# Patient Record
Sex: MALE | Race: WHITE
[De-identification: names, ages, dates, MRNs, and addresses within clinical notes are randomized per-mention and may not be internally consistent; named-entity substitution may affect disease eponyms.]

---

## 2020-01-14 ENCOUNTER — HOSPITAL ENCOUNTER (OUTPATIENT)
Dept: HOSPITAL 19 - SDCO | Age: 50
Discharge: HOME | End: 2020-01-14
Attending: SURGERY
Payer: COMMERCIAL

## 2020-01-14 VITALS — DIASTOLIC BLOOD PRESSURE: 89 MMHG | SYSTOLIC BLOOD PRESSURE: 136 MMHG | HEART RATE: 70 BPM | TEMPERATURE: 97.9 F

## 2020-01-14 VITALS — SYSTOLIC BLOOD PRESSURE: 126 MMHG | HEART RATE: 70 BPM | DIASTOLIC BLOOD PRESSURE: 74 MMHG

## 2020-01-14 VITALS — SYSTOLIC BLOOD PRESSURE: 128 MMHG | HEART RATE: 65 BPM | DIASTOLIC BLOOD PRESSURE: 81 MMHG

## 2020-01-14 VITALS — HEART RATE: 63 BPM | DIASTOLIC BLOOD PRESSURE: 76 MMHG | SYSTOLIC BLOOD PRESSURE: 130 MMHG

## 2020-01-14 VITALS — TEMPERATURE: 97.3 F

## 2020-01-14 VITALS — BODY MASS INDEX: 41.14 KG/M2 | HEIGHT: 69 IN | WEIGHT: 277.78 LBS

## 2020-01-14 DIAGNOSIS — C43.59: Primary | ICD-10-CM

## 2020-01-14 DIAGNOSIS — E78.00: ICD-10-CM

## 2020-01-14 DIAGNOSIS — Z88.0: ICD-10-CM

## 2020-01-14 DIAGNOSIS — G47.33: ICD-10-CM

## 2020-01-14 DIAGNOSIS — F17.220: ICD-10-CM

## 2020-01-14 PROCEDURE — A9541 TC99M SULFUR COLLOID: HCPCS

## 2020-01-14 NOTE — NUR
Patient dismissed to home driven by spouse and taken to the front door per
wheelchair and assisted into car by this RN with dismissal instructions in
hand and script for Ultram.

## 2020-01-14 NOTE — NUR
Assisted up to the bathroom and gait is steady.  Voids and returns to room.
Continues to deny pain or nausea.

## 2020-01-14 NOTE — NUR
IV discontinued and given dismissal instructions for pain medications, wet to
dry dressing changes, and to report signs and symptoms of infection.

## 2020-01-14 NOTE — NUR
Patient returns to room 7 per cart from PACU and is awake and alert.  Dressing
on mid back area noted with three small areas of drainage.  No increase in
size from being marked in PACU.  IV fluids infusing #18G LW.  Siderails up x2
and call light in reach.  Patient drinking water and denies pain or nausea.

## 2020-01-14 NOTE — NUR
No increase in size of drainage on back dressing.  Teran set covering incision
under the right arm dry without drainage noted.  Family in room and  continues
to deny pain or nausea.